# Patient Record
Sex: FEMALE | Race: WHITE | Employment: PART TIME | URBAN - METROPOLITAN AREA
[De-identification: names, ages, dates, MRNs, and addresses within clinical notes are randomized per-mention and may not be internally consistent; named-entity substitution may affect disease eponyms.]

---
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
## 2021-02-12 ENCOUNTER — OFFICE VISIT (OUTPATIENT)
Dept: PRIMARY CARE CLINIC | Age: 21
End: 2021-02-12
Payer: COMMERCIAL

## 2021-02-12 VITALS
WEIGHT: 154 LBS | HEIGHT: 67 IN | HEART RATE: 81 BPM | OXYGEN SATURATION: 99 % | BODY MASS INDEX: 24.17 KG/M2 | SYSTOLIC BLOOD PRESSURE: 118 MMHG | DIASTOLIC BLOOD PRESSURE: 74 MMHG | TEMPERATURE: 97.8 F

## 2021-02-12 DIAGNOSIS — H00.025 HORDEOLUM INTERNUM OF LEFT LOWER EYELID: Primary | ICD-10-CM

## 2021-02-12 DIAGNOSIS — H57.89 EYE SWELLING, LEFT: ICD-10-CM

## 2021-02-12 DIAGNOSIS — H00.015 HORDEOLUM EXTERNUM LEFT LOWER EYELID: ICD-10-CM

## 2021-02-12 PROCEDURE — 99213 OFFICE O/P EST LOW 20 MIN: CPT | Performed by: NURSE PRACTITIONER

## 2021-02-12 RX ORDER — CETIRIZINE HYDROCHLORIDE 10 MG/1
10 TABLET ORAL DAILY
Qty: 30 TABLET | Refills: 0 | Status: SHIPPED
Start: 2021-02-12 | End: 2021-07-07 | Stop reason: ALTCHOICE

## 2021-02-12 RX ORDER — TOBRAMYCIN 3 MG/ML
1 SOLUTION/ DROPS OPHTHALMIC EVERY 4 HOURS
Qty: 1 BOTTLE | Refills: 0 | Status: SHIPPED | OUTPATIENT
Start: 2021-02-12 | End: 2021-02-22

## 2021-02-12 ASSESSMENT — ENCOUNTER SYMPTOMS
COUGH: 0
WHEEZING: 0
NAUSEA: 0
SINUS PAIN: 0
EYE ITCHING: 0
ABDOMINAL PAIN: 0
TROUBLE SWALLOWING: 0
SHORTNESS OF BREATH: 0
EYE REDNESS: 1
EYE DISCHARGE: 0
CHEST TIGHTNESS: 0
VOICE CHANGE: 0
SORE THROAT: 0
RHINORRHEA: 0
FACIAL SWELLING: 0
EYE PAIN: 1
DIARRHEA: 0
VOMITING: 0

## 2021-02-12 ASSESSMENT — VISUAL ACUITY: OU: 1

## 2021-02-12 ASSESSMENT — PATIENT HEALTH QUESTIONNAIRE - PHQ9
1. LITTLE INTEREST OR PLEASURE IN DOING THINGS: 0
SUM OF ALL RESPONSES TO PHQ QUESTIONS 1-9: 0
SUM OF ALL RESPONSES TO PHQ9 QUESTIONS 1 & 2: 0

## 2021-02-12 NOTE — PATIENT INSTRUCTIONS
Patient Education        Styes and Chalazia: Care Instructions  Your Care Instructions     Styes and chalazia (say \"zun-JZW-bzg-uh\") are both conditions that can cause swelling of the eyelid. A stye is an infection in the root of an eyelash. The infection causes a tender red lump on the edge of the eyelid. The infection can spread until the whole eyelid becomes red and inflamed. Styes usually break open, and a tiny amount of pus drains. They usually clear up on their own in about a week, but they sometimes need treatment with antibiotics. A chalazion is a lump or cyst in the eyelid (chalazion is singular; chalazia is plural). It is caused by swelling and inflammation of deep oil glands inside the eyelid. Chalazia are usually not infected. They can take a few months to heal.  If a chalazion becomes more swollen and painful or does not go away, you may need to have it drained by your doctor. Follow-up care is a key part of your treatment and safety. Be sure to make and go to all appointments, and call your doctor if you are having problems. It's also a good idea to know your test results and keep a list of the medicines you take. How can you care for yourself at home? · Do not rub your eyes. Do not squeeze or try to open a stye or chalazion. · To help a stye or chalazion heal faster:  ? Put a warm, moist compress on your eye for 5 to 10 minutes, 3 to 6 times a day. Heat often brings a stye to a point where it drains on its own. Keep in mind that warm compresses will often increase swelling a little at first.  ? Do not use hot water or heat a wet cloth in a microwave oven. The compress may get too hot and can burn the eyelid. · Always wash your hands before and after you use a compress or touch your eyes. · If the doctor gave you antibiotic drops or ointment, use the medicine exactly as directed. Use the medicine for as long as instructed, even if your eye starts to feel better.   · To put in eyedrops or ointment:  ? Tilt your head back, and pull your lower eyelid down with one finger. ? Drop or squirt the medicine inside the lower lid. ? Close your eye for 30 to 60 seconds to let the drops or ointment move around. ? Do not touch the ointment or dropper tip to your eyelashes or any other surface. · Do not wear eye makeup or contact lenses until the stye or chalazion heals. · Do not share towels, pillows, or washcloths while you have a stye. When should you call for help? Call your doctor now or seek immediate medical care if:    · You have pain in your eye.     · You have a change in vision or loss of vision.     · Redness and swelling get much worse. Watch closely for changes in your health, and be sure to contact your doctor if:    · Your stye does not get better in 1 week.     · Your chalazion does not start to get better after several weeks. Where can you learn more? Go to https://[employer]. org and sign in to your [employer] account. Enter W435 in the GoVoluntr box to learn more about \"Styes and Chalazia: Care Instructions. \"     If you do not have an account, please click on the \"Sign Up Now\" link. Current as of: December 18, 2019               Content Version: 12.6  © 2594-7205 [employer]. Care instructions adapted under license by [facility] ([facility]). If you have questions about a medical condition or this instruction, always ask your healthcare professional. [place] [address] any warranty or liability for your use of this information. Patient Education         Here's Help: How to Give Yourself Eyedrops or Eye Ointment (01:53)  Your health professional recommends that you watch this short online health video. Here's help with using eyedrops or an eye ointment. How to watch the video    Scan the QR code   OR Visit the website    https://hwi. se/r/Jnqtoxg78ejvj   Current as of: June 26, 2019               Content Version: 12.6  © 0811-2116 [employer]. Care instructions adapted under license by [facility] ([facility]). If you have questions about a medical condition or this instruction, always ask your healthcare professional. [address] any warranty or liability for your use of this information.

## 2021-02-12 NOTE — PROGRESS NOTES
2021    Nallely Purcell (:  2000) is a 24 y.o. female,Established patient, here for evaluation of the following chief complaint(s):  Eye Problem (left eye swelling( first time) and approx 1 week ago)    Chief Complaint   Patient presents with    Eye Problem     left eye swelling( first time) and approx 1 week ago       ASSESSMENT/PLAN:    1. Hordeolum internum of left lower eyelid  2. Hordeolum externum left lower eyelid  3. Eye swelling, left      No follow-ups on file. SUBJECTIVE/OBJECTIVE:    This is a very pleasant 50-year-old white female who comes with reports of redness and tenderness on the lateral and medial lower lid episodic resolves within 1 or 2 days but has had multiple events since January she denies any trauma or visual changes there is no mucoid discharge no new medications, no new make-up      Review of Systems   Constitutional: Negative for chills, fatigue and fever. HENT: Positive for congestion. Negative for dental problem, ear discharge, ear pain, facial swelling, hearing loss, nosebleeds, postnasal drip, rhinorrhea, sinus pain, sore throat, tinnitus, trouble swallowing and voice change. Eyes: Positive for pain and redness. Negative for discharge and itching. Respiratory: Negative for cough, chest tightness, shortness of breath and wheezing. Cardiovascular: Negative for chest pain. Gastrointestinal: Negative for abdominal pain, diarrhea, nausea and vomiting. Musculoskeletal: Negative for neck pain and neck stiffness. Skin: Negative for rash. Allergic/Immunologic: Negative for environmental allergies. Neurological: Negative for dizziness, facial asymmetry, light-headedness and headaches. Hematological: Negative for adenopathy.          Current Outpatient Medications:     tobramycin (TOBREX) 0.3 % ophthalmic solution, Place 1 drop into the left eye every 4 hours for 10 days, Disp: 1 Bottle, Rfl: 0    cetirizine (ZYRTEC) 10 MG tablet, Take 1 tablet by mouth daily, Disp: 30 tablet, Rfl: 0    desogestrel-ethinyl estradiol (ISIBLOOM) 0.15-30 MG-MCG per tablet, Take 1 tablet by mouth daily, Disp: , Rfl:     No Known Allergies    Vitals:    02/12/21 0942   BP: 118/74   Pulse: 81   Temp: 97.8 °F (36.6 °C)   SpO2: 99%   Weight: 154 lb (69.9 kg)   Height: 5' 7\" (1.702 m)       Wt Readings from Last 3 Encounters:   02/12/21 154 lb (69.9 kg) (84 %, Z= 0.99)*   06/01/20 150 lb (68 kg) (83 %, Z= 0.94)*   02/03/19 130 lb (59 kg) (64 %, Z= 0.35)*     * Growth percentiles are based on [facility] (Girls, 2-20 Years) data. BP Readings from Last 3 Encounters:   02/12/21 118/74   06/01/20 114/72   02/03/19 136/74 (99 %, Z = 2.18 /  76 %, Z = 0.72)*     *BP percentiles are based on the 2017 AAP Clinical Practice Guideline for girls       Physical Exam  Constitutional:       Appearance: Normal appearance. HENT:      Head: Normocephalic. Right Ear: Tympanic membrane and ear canal normal. No decreased hearing noted. No drainage or tenderness. No middle ear effusion. There is no impacted cerumen. No foreign body. Tympanic membrane is not injected, scarred, perforated or erythematous. Left Ear: Tympanic membrane normal. No decreased hearing noted. No drainage or tenderness. No middle ear effusion. There is no impacted cerumen. No foreign body. Tympanic membrane is not injected, scarred, perforated or erythematous. Nose:      Right Sinus: No maxillary sinus tenderness or frontal sinus tenderness. Left Sinus: No maxillary sinus tenderness or frontal sinus tenderness. Eyes:      General: Lids are normal. Vision grossly intact. No allergic shiner. Right eye: No discharge. Left eye: No discharge. Extraocular Movements:      Right eye: No nystagmus. Left eye: No nystagmus. Conjunctiva/sclera:      Right eye: Right conjunctiva is not injected. No exudate or hemorrhage. Left eye: Left conjunctiva is not injected.  No exudate or hemorrhage. Neck:      Musculoskeletal: Normal range of motion. No neck rigidity or muscular tenderness. Thyroid: No thyromegaly. Meningeal: Brudzinski's sign and Kernig's sign absent. Cardiovascular:      Rate and Rhythm: Normal rate and regular rhythm. Heart sounds: Normal heart sounds. Pulmonary:      Effort: No retractions. Breath sounds: No decreased breath sounds, wheezing, rhonchi or rales. Abdominal:      Tenderness: There is no abdominal tenderness. Musculoskeletal: Normal range of motion. Lymphadenopathy:      Cervical: No cervical adenopathy. Right cervical: No superficial, deep or posterior cervical adenopathy. Left cervical: No superficial, deep or posterior cervical adenopathy. Skin:     General: Skin is warm. Neurological:      Mental Status: She is alert. No results found for this visit on 02/12/21. On this date 02/12/21 I have spent 15 minutes reviewing previous notes, test results and face to face with the patient discussing the diagnosis and importance of compliance with the treatment plan as well as documenting on the day of the visit. Educational materials and/or home exercises printed for patient's review and were included in patient instructions on his/her After Visit Summary and given to patient at the end of visit. Counseled regarding above diagnosis, including possible risks and complications,  especially if left uncontrolled. Counseled regarding the possible side effects, risks, benefits and alternatives to treatment; patient and/or guardian verbalizes understanding, agrees, feels comfortable with and wishes to proceed with above treatment plan. Advised patient to call with any new medication issues, and read all Rx info from pharmacy to assure aware of all possible risks and side effects of medication before taking. Reviewed age and gender appropriate health screening exams and vaccinations.   Advised patient

## 2021-12-09 PROBLEM — L52 ERYTHEMA NODOSUM: Status: ACTIVE | Noted: 2021-12-09

## 2023-03-03 DIAGNOSIS — Z34.92 PRENATAL CARE, SECOND TRIMESTER: ICD-10-CM

## 2023-03-06 LAB
GROUP B STREP CULTURE: ABNORMAL
ORGANISM: ABNORMAL

## 2023-03-08 LAB
C TRACH DNA GENITAL QL NAA+PROBE: NEGATIVE
N. GONORRHOEAE DNA: NEGATIVE
SOURCE: NORMAL

## 2023-03-31 PROBLEM — Z34.03 PRIMIGRAVIDA IN THIRD TRIMESTER: Status: ACTIVE | Noted: 2023-03-31

## 2023-03-31 PROBLEM — Z86.19 HISTORY OF HERPES GENITALIS: Status: ACTIVE | Noted: 2023-03-31

## 2023-03-31 PROBLEM — B00.9 HERPES SIMPLEX TYPE 2 (HSV-2) INFECTION AFFECTING PREGNANCY, ANTEPARTUM, THIRD TRIMESTER: Status: ACTIVE | Noted: 2023-03-31

## 2023-03-31 PROBLEM — O98.513 HERPES SIMPLEX TYPE 2 (HSV-2) INFECTION AFFECTING PREGNANCY, ANTEPARTUM, THIRD TRIMESTER: Status: ACTIVE | Noted: 2023-03-31

## 2023-03-31 PROBLEM — O99.820 GBS (GROUP B STREPTOCOCCUS CARRIER), +RV CULTURE, CURRENTLY PREGNANT: Status: ACTIVE | Noted: 2023-03-31

## 2023-03-31 PROBLEM — O09.899 MATERNAL VARICELLA, NON-IMMUNE: Status: ACTIVE | Noted: 2023-03-31

## 2023-03-31 PROBLEM — Z28.39 MATERNAL VARICELLA, NON-IMMUNE: Status: ACTIVE | Noted: 2023-03-31

## 2023-03-31 PROBLEM — O09.299 HISTORY OF SPONTANEOUS ABORTION, CURRENTLY PREGNANT: Status: ACTIVE | Noted: 2023-03-31

## 2023-03-31 PROBLEM — Z28.39 RUBELLA NONIMMUNE STATUS, DELIVERED, CURRENT HOSPITALIZATION: Status: ACTIVE | Noted: 2023-03-31

## 2023-04-11 ENCOUNTER — HOSPITAL ENCOUNTER (INPATIENT)
Age: 23
LOS: 3 days | Discharge: HOME | End: 2023-04-14
Attending: OBSTETRICS & GYNECOLOGY | Admitting: OBSTETRICS & GYNECOLOGY
Payer: COMMERCIAL

## 2023-04-11 ENCOUNTER — APPOINTMENT (OUTPATIENT)
Dept: LABOR AND DELIVERY | Age: 23
End: 2023-04-11
Payer: COMMERCIAL

## 2023-04-11 PROBLEM — Z34.90 ENCOUNTER FOR INDUCTION OF LABOR: Status: ACTIVE | Noted: 2023-04-11

## 2023-04-11 LAB
ABO + RH BLD: NORMAL
AMPHET UR QL SCN: NOT DETECTED
BARBITURATES UR QL SCN: NOT DETECTED
BENZODIAZ UR QL SCN: NOT DETECTED
BLD GP AB SCN SERPL QL: NORMAL
CANNABINOIDS UR QL SCN: NOT DETECTED
COCAINE UR QL SCN: NOT DETECTED
DRUG SCREEN COMMENT UR-IMP: NORMAL
ERYTHROCYTE [DISTWIDTH] IN BLOOD BY AUTOMATED COUNT: 13.7 FL (ref 11.5–15)
FENTANYL SCREEN, URINE: NOT DETECTED
HCT VFR BLD AUTO: 37.5 % (ref 34–48)
HGB BLD-MCNC: 12.9 G/DL (ref 11.5–15.5)
MCH RBC QN AUTO: 32.3 PG (ref 26–35)
MCHC RBC AUTO-ENTMCNC: 34.4 % (ref 32–34.5)
MCV RBC AUTO: 93.8 FL (ref 80–99.9)
METHADONE UR QL SCN: NOT DETECTED
OPIATES UR QL SCN: NOT DETECTED
OXYCODONE URINE: NOT DETECTED
PCP UR QL SCN: NOT DETECTED
PLATELET # BLD AUTO: 157 E9/L (ref 130–450)
PMV BLD AUTO: 12.2 FL (ref 7–12)
RBC # BLD AUTO: 4 E12/L (ref 3.5–5.5)
WBC # BLD: 11.8 E9/L (ref 4.5–11.5)

## 2023-04-11 PROCEDURE — 6360000002 HC RX W HCPCS: Performed by: OBSTETRICS & GYNECOLOGY

## 2023-04-11 PROCEDURE — APPNB15 APP NON BILLABLE TIME 0-15 MINS: Performed by: ADVANCED PRACTICE MIDWIFE

## 2023-04-11 PROCEDURE — 86900 BLOOD TYPING SEROLOGIC ABO: CPT

## 2023-04-11 PROCEDURE — 1220000001 HC SEMI PRIVATE L&D R&B

## 2023-04-11 PROCEDURE — 2500000003 HC RX 250 WO HCPCS: Performed by: ANESTHESIOLOGY

## 2023-04-11 PROCEDURE — 2580000003 HC RX 258: Performed by: OBSTETRICS & GYNECOLOGY

## 2023-04-11 PROCEDURE — 51701 INSERT BLADDER CATHETER: CPT

## 2023-04-11 PROCEDURE — 99221 1ST HOSP IP/OBS SF/LOW 40: CPT | Performed by: ADVANCED PRACTICE MIDWIFE

## 2023-04-11 PROCEDURE — 36415 COLL VENOUS BLD VENIPUNCTURE: CPT

## 2023-04-11 PROCEDURE — 80307 DRUG TEST PRSMV CHEM ANLYZR: CPT

## 2023-04-11 PROCEDURE — 85027 COMPLETE CBC AUTOMATED: CPT

## 2023-04-11 PROCEDURE — 86850 RBC ANTIBODY SCREEN: CPT

## 2023-04-11 PROCEDURE — 86901 BLOOD TYPING SEROLOGIC RH(D): CPT

## 2023-04-11 RX ORDER — PENICILLIN G 3000000 [IU]/50ML
3 INJECTION, SOLUTION INTRAVENOUS EVERY 4 HOURS
Status: DISCONTINUED | OUTPATIENT
Start: 2023-04-11 | End: 2023-04-12

## 2023-04-11 RX ORDER — MISOPROSTOL 200 UG/1
800 TABLET ORAL PRN
Status: DISCONTINUED | OUTPATIENT
Start: 2023-04-11 | End: 2023-04-12

## 2023-04-11 RX ORDER — CARBOPROST TROMETHAMINE 250 UG/ML
250 INJECTION, SOLUTION INTRAMUSCULAR PRN
Status: DISCONTINUED | OUTPATIENT
Start: 2023-04-11 | End: 2023-04-12

## 2023-04-11 RX ORDER — SODIUM CHLORIDE 0.9 % (FLUSH) 0.9 %
5-40 SYRINGE (ML) INJECTION EVERY 12 HOURS SCHEDULED
Status: DISCONTINUED | OUTPATIENT
Start: 2023-04-11 | End: 2023-04-12

## 2023-04-11 RX ORDER — ONDANSETRON 2 MG/ML
4 INJECTION INTRAMUSCULAR; INTRAVENOUS EVERY 6 HOURS PRN
Status: DISCONTINUED | OUTPATIENT
Start: 2023-04-11 | End: 2023-04-12

## 2023-04-11 RX ORDER — NALOXONE HYDROCHLORIDE 0.4 MG/ML
INJECTION, SOLUTION INTRAMUSCULAR; INTRAVENOUS; SUBCUTANEOUS PRN
Status: DISCONTINUED | OUTPATIENT
Start: 2023-04-11 | End: 2023-04-12 | Stop reason: HOSPADM

## 2023-04-11 RX ORDER — SODIUM CHLORIDE, SODIUM LACTATE, POTASSIUM CHLORIDE, AND CALCIUM CHLORIDE .6; .31; .03; .02 G/100ML; G/100ML; G/100ML; G/100ML
1000 INJECTION, SOLUTION INTRAVENOUS PRN
Status: DISCONTINUED | OUTPATIENT
Start: 2023-04-11 | End: 2023-04-12

## 2023-04-11 RX ORDER — SODIUM CHLORIDE, SODIUM LACTATE, POTASSIUM CHLORIDE, CALCIUM CHLORIDE 600; 310; 30; 20 MG/100ML; MG/100ML; MG/100ML; MG/100ML
INJECTION, SOLUTION INTRAVENOUS CONTINUOUS
Status: DISCONTINUED | OUTPATIENT
Start: 2023-04-11 | End: 2023-04-12

## 2023-04-11 RX ORDER — SODIUM CHLORIDE 9 MG/ML
25 INJECTION, SOLUTION INTRAVENOUS PRN
Status: DISCONTINUED | OUTPATIENT
Start: 2023-04-11 | End: 2023-04-12

## 2023-04-11 RX ORDER — DOCUSATE SODIUM 100 MG/1
100 CAPSULE, LIQUID FILLED ORAL 2 TIMES DAILY
Status: DISCONTINUED | OUTPATIENT
Start: 2023-04-11 | End: 2023-04-12

## 2023-04-11 RX ORDER — METHYLERGONOVINE MALEATE 0.2 MG/ML
200 INJECTION INTRAVENOUS PRN
Status: DISCONTINUED | OUTPATIENT
Start: 2023-04-11 | End: 2023-04-12

## 2023-04-11 RX ORDER — ONDANSETRON 2 MG/ML
4 INJECTION INTRAMUSCULAR; INTRAVENOUS EVERY 6 HOURS PRN
Status: DISCONTINUED | OUTPATIENT
Start: 2023-04-11 | End: 2023-04-12 | Stop reason: HOSPADM

## 2023-04-11 RX ORDER — SODIUM CHLORIDE 0.9 % (FLUSH) 0.9 %
5-40 SYRINGE (ML) INJECTION PRN
Status: DISCONTINUED | OUTPATIENT
Start: 2023-04-11 | End: 2023-04-12

## 2023-04-11 RX ORDER — SODIUM CHLORIDE, SODIUM LACTATE, POTASSIUM CHLORIDE, AND CALCIUM CHLORIDE .6; .31; .03; .02 G/100ML; G/100ML; G/100ML; G/100ML
500 INJECTION, SOLUTION INTRAVENOUS PRN
Status: DISCONTINUED | OUTPATIENT
Start: 2023-04-11 | End: 2023-04-12

## 2023-04-11 RX ADMIN — SODIUM CHLORIDE, POTASSIUM CHLORIDE, SODIUM LACTATE AND CALCIUM CHLORIDE 1000 ML: 600; 310; 30; 20 INJECTION, SOLUTION INTRAVENOUS at 14:27

## 2023-04-11 RX ADMIN — Medication 15 ML/HR: at 23:53

## 2023-04-11 RX ADMIN — SODIUM CHLORIDE, POTASSIUM CHLORIDE, SODIUM LACTATE AND CALCIUM CHLORIDE: 600; 310; 30; 20 INJECTION, SOLUTION INTRAVENOUS at 08:15

## 2023-04-11 RX ADMIN — PENICILLIN G 3 MILLION UNITS: 3000000 INJECTION, SOLUTION INTRAVENOUS at 16:35

## 2023-04-11 RX ADMIN — PENICILLIN G 3 MILLION UNITS: 3000000 INJECTION, SOLUTION INTRAVENOUS at 20:51

## 2023-04-11 RX ADMIN — PENICILLIN G 3 MILLION UNITS: 3000000 INJECTION, SOLUTION INTRAVENOUS at 12:48

## 2023-04-11 RX ADMIN — DEXTROSE MONOHYDRATE 5 MILLION UNITS: 50 INJECTION, SOLUTION INTRAVENOUS at 08:53

## 2023-04-11 RX ADMIN — Medication 1 MILLI-UNITS/MIN: at 08:53

## 2023-04-11 NOTE — H&P
CHIEF COMPLAINT:  IOL due to LGA and hx STD's, Some ctx, no lof or vb, no pih sx. Baby moving well    HISTORY OF PRESENT ILLNESS:      The patient is a 25 y.o. female at 43w3d. OB History          2    Para        Term                AB   1    Living             SAB   1    IAB        Ectopic        Molar        Multiple        Live Births                Patient presents with a chief complaint as above and is being admitted for induction    Estimated Due Date: Estimated Date of Delivery: 23    PRENATAL CARE:    Complicated by: Hx trich, HSV on valtrex, hx trich, GBS +, previous miscarriage    PAST OB HISTORY  OB History          2    Para        Term                AB   1    Living             SAB   1    IAB        Ectopic        Molar        Multiple        Live Births                    Past Medical History:    History reviewed. No pertinent past medical history. Past Surgical History:    History reviewed. No pertinent surgical history. Allergies:  Patient has no known allergies.   Social History:    Social History     Socioeconomic History    Marital status: Single     Spouse name: Not on file    Number of children: Not on file    Years of education: Not on file    Highest education level: Not on file   Occupational History    Not on file   Tobacco Use    Smoking status: Former     Types: Cigarettes    Smokeless tobacco: Never    Tobacco comments:     3   a day   Vaping Use    Vaping Use: Never used   Substance and Sexual Activity    Alcohol use: Never    Drug use: Never    Sexual activity: Yes     Partners: Male   Other Topics Concern    Not on file   Social History Narrative    Not on file     Social Determinants of Health     Financial Resource Strain: Not on file   Food Insecurity: Not on file   Transportation Needs: Not on file   Physical Activity: Not on file   Stress: Not on file   Social Connections: Not on file   Intimate Partner Violence: Not on file   Housing

## 2023-04-12 PROBLEM — Z3A.39 39 WEEKS GESTATION OF PREGNANCY: Status: ACTIVE | Noted: 2023-04-12

## 2023-04-12 PROCEDURE — 1220000000 HC SEMI PRIVATE OB R&B

## 2023-04-12 PROCEDURE — 7200000001 HC VAGINAL DELIVERY

## 2023-04-12 PROCEDURE — 2500000003 HC RX 250 WO HCPCS: Performed by: OBSTETRICS & GYNECOLOGY

## 2023-04-12 PROCEDURE — 3E033VJ INTRODUCTION OF OTHER HORMONE INTO PERIPHERAL VEIN, PERCUTANEOUS APPROACH: ICD-10-PCS | Performed by: OBSTETRICS & GYNECOLOGY

## 2023-04-12 PROCEDURE — 6360000002 HC RX W HCPCS: Performed by: OBSTETRICS & GYNECOLOGY

## 2023-04-12 PROCEDURE — 0HQ9XZZ REPAIR PERINEUM SKIN, EXTERNAL APPROACH: ICD-10-PCS | Performed by: OBSTETRICS & GYNECOLOGY

## 2023-04-12 PROCEDURE — 0UQMXZZ REPAIR VULVA, EXTERNAL APPROACH: ICD-10-PCS | Performed by: OBSTETRICS & GYNECOLOGY

## 2023-04-12 PROCEDURE — 6370000000 HC RX 637 (ALT 250 FOR IP): Performed by: OBSTETRICS & GYNECOLOGY

## 2023-04-12 PROCEDURE — 10907ZC DRAINAGE OF AMNIOTIC FLUID, THERAPEUTIC FROM PRODUCTS OF CONCEPTION, VIA NATURAL OR ARTIFICIAL OPENING: ICD-10-PCS | Performed by: OBSTETRICS & GYNECOLOGY

## 2023-04-12 RX ORDER — MODIFIED LANOLIN
OINTMENT (GRAM) TOPICAL PRN
Status: DISCONTINUED | OUTPATIENT
Start: 2023-04-12 | End: 2023-04-14 | Stop reason: HOSPADM

## 2023-04-12 RX ORDER — ACETAMINOPHEN 500 MG
1000 TABLET ORAL EVERY 6 HOURS PRN
Status: DISCONTINUED | OUTPATIENT
Start: 2023-04-12 | End: 2023-04-14 | Stop reason: HOSPADM

## 2023-04-12 RX ORDER — ACETAMINOPHEN 650 MG
TABLET, EXTENDED RELEASE ORAL PRN
Status: DISCONTINUED | OUTPATIENT
Start: 2023-04-12 | End: 2023-04-12

## 2023-04-12 RX ORDER — IBUPROFEN 600 MG/1
600 TABLET ORAL EVERY 6 HOURS PRN
Status: DISCONTINUED | OUTPATIENT
Start: 2023-04-12 | End: 2023-04-14 | Stop reason: HOSPADM

## 2023-04-12 RX ORDER — DOCUSATE SODIUM 100 MG/1
100 CAPSULE, LIQUID FILLED ORAL 2 TIMES DAILY
Status: DISCONTINUED | OUTPATIENT
Start: 2023-04-12 | End: 2023-04-14 | Stop reason: HOSPADM

## 2023-04-12 RX ORDER — LIDOCAINE HYDROCHLORIDE 10 MG/ML
20 INJECTION, SOLUTION EPIDURAL; INFILTRATION; INTRACAUDAL; PERINEURAL ONCE
Status: COMPLETED | OUTPATIENT
Start: 2023-04-12 | End: 2023-04-12

## 2023-04-12 RX ORDER — PRENATAL WITH FERROUS FUM AND FOLIC ACID 3080; 920; 120; 400; 22; 1.84; 3; 20; 10; 1; 12; 200; 27; 25; 2 [IU]/1; [IU]/1; MG/1; [IU]/1; MG/1; MG/1; MG/1; MG/1; MG/1; MG/1; UG/1; MG/1; MG/1; MG/1; MG/1
1 TABLET ORAL
Status: DISCONTINUED | OUTPATIENT
Start: 2023-04-12 | End: 2023-04-14 | Stop reason: HOSPADM

## 2023-04-12 RX ADMIN — LIDOCAINE HYDROCHLORIDE 20 ML: 10 INJECTION, SOLUTION EPIDURAL; INFILTRATION; INTRACAUDAL; PERINEURAL at 01:13

## 2023-04-12 RX ADMIN — METFORMIN HYDROCHLORIDE 1 TABLET: 500 TABLET, EXTENDED RELEASE ORAL at 08:21

## 2023-04-12 RX ADMIN — DOCUSATE SODIUM 100 MG: 100 CAPSULE ORAL at 08:22

## 2023-04-12 RX ADMIN — Medication: at 01:10

## 2023-04-12 RX ADMIN — Medication 87.3 MILLI-UNITS/MIN: at 01:26

## 2023-04-12 RX ADMIN — Medication 166.7 ML: at 01:13

## 2023-04-12 NOTE — LACTATION NOTE
Mom reports baby has latched well a couple times, sleepy now. Encouraged skin to skin and frequent attempts at breast to stimulate milk production. Instructed on normal infant behavior in the first 12-24 hours and importance of stimulating the baby frequently to eat during this time. Reviewed hand expression, and encouraged to hand express drops of colostrum when baby is sleepy. Instructed that baby may also feed 8-12 times a day- cluster feeding at times- as her milk supply is being established. Instructed on benefits of skin to skin and avoidance of pacifier / artificial nipple use until breastfeeding is well established. Educated on making sure infant has an open airway while breastfeeding and skin to skin. Instructed on hunger cues and waking techniques to try. Reviewed signs of adequate I & O; allow baby to feed ad donovan and not to limit time at breast. Breastfeeding booklet provided with review of its contents. Encouraged to call with any concerns. Mom has a breast pump for home use.

## 2023-04-12 NOTE — L&D DELIVERY NOTE
Department of Obstetrics and Gynecology  Spontaneous Vaginal Delivery Note    Patient:  Jorden Cee     Admit Date:  2023  7:22 AM  Medical Record Number:  [id number]   Procedure Date: 2023 2:23 AM     Pre-delivery Diagnosis: Functional primigravida IUP at 44 weeks 5 days with history of spontaneous , GBS positive, rubella equivocal, maternal varicella nonimmune, HSV 1 genital culture positive during pregnancy (inactive on prophylactic Valtrex), history of trichomonas (treated with negative test of cure)  Patient Active Problem List   Diagnosis    Erythema nodosum    Functional Primigravida in third trimester (transfer from [place] at 26w)    GBS (group B Streptococcus carrier), +RV culture, currently pregnant -PCN G in labor    History of spontaneous  x1, currently pregnant    H/O trichomonal vaginitis -was treated and repeat EMERSON negative with previous office     Herpes simplex type 2 (HSV-2) infection affecting pregnancy, antepartum, third trimester - Valtrex started at 36 weeks. Maternal varicella, non-immune    Rubella EQUIVOCAL status, delivered, current hospitalization    History of herpes genitalis  (HSV-1 + genital culture during pregnancy)    Encounter for induction of labor    39 5/7 weeks gestation of pregnancy      Post-delivery Diagnosis:  Living  infant Female, tight nuchal cord x1, bilateral labia lacerations, first-degree perineal laceration  Patient Active Problem List   Diagnosis    Erythema nodosum    Functional Primigravida in third trimester (transfer from [place] at 26w)    GBS (group B Streptococcus carrier), +RV culture, currently pregnant -PCN G in labor    History of spontaneous  x1, currently pregnant    H/O trichomonal vaginitis -was treated and repeat EMERSON negative with previous office     Herpes simplex type 2 (HSV-2) infection affecting pregnancy, antepartum, third trimester - Valtrex started at 36 weeks.     Maternal varicella, non-immune
Pt states he had a TIA last week however never came to hospital.   Today comes in for chest pressure that started while driving. saw cardiologist 3 years ago and was cleared. Currently doesn't have any pain or sob.

## 2023-04-12 NOTE — DISCHARGE INSTRUCTIONS
Follow up with your OB doctor in 2 week for a  delivery or in 6 weeks for a vaginal delivery unless otherwise instructed, call the office for appointment. .  For breastfeeding support, you can contact our lactation specialists at [phone] or   [phone]. DIET  Eat a well balanced diet focusing on foods high in fiber and protein  Drink plenty of fluids especially water. To avoid constipation you may take a mild stool softener as recommended by your doctor or midwife. ACTIVITY  Gradually increase your activity. Resume exercise regimen only after advised by your doctor or midwife. Avoid lifting anything heavier than your baby or a gallon of milk until OK'd by your physician or midlife. Avoid driving until your doctor or midwife has given their approval.  Leana Owens slowly from a lying to sitting and then a standing position. Climb stairs one at a time. Use caution when carrying your baby up and down the stairs. No sexual activity for 6 weeks or until advised by your doctor - Nothing in vagina: intercourse, tampons, or douching. Be prepared to discuss family planning at your follow-up OB visit. You may feel tired or have a lack of energy. You may continue your prenatal vitamin to replenish nutrients post delivery. Nap when infant naps to catch up on sleep. May return to work or school in 6 weeks or as directed by physician or midlife. Take pain medication as prescribed whenever you need them  Take care of yourself by sleeping/resting as much as possible. Let someone else care for you, your infant and housework as much as possible for a while. EMOTIONS  You may feed daniel, sad, teary, & overwhelmed. If infant will not stop crying, contact another adult for help or place infant in their crib on their back and take a break. NEVER shake your infant.     Call your doctor if you have unrelieved feelings of: inability to cope,

## 2023-04-12 NOTE — CARE COORDINATION
Social Work discharge [id number] [facility] consult stating \"concern for safety\" noted. SW met with Roger Lao, first time mother of  Hayde Lofton. Woman in room that Roger Lao identified as her mother, Emilio Bernardo, was also present. Baby was bedside in [facility]. Roger Lao said she lives with her mother and father Richardson Ragsdale 15 yo brother, as well as reported FOB Sergey Callejaslow (83) on the family[place] farm. Roger Lao is on her father [employer]. Roger Lao said she has not spoken to financial aide yet to apply for medicaid for baby. Roger Lao reports having transportation, a car seat and a crib for baby. PNC was with Dr Rancho Grijalva and Pediatric care will be with Dr Ricci Stinson. Yamileth's mother Richardson Ragsdale left room upon SW's request for information privacy. SW advised Roger Lao that SW consulted re: concern for her safety with reported FOB. Roger Lao denied hx DV in their relationship. Roger Lao said she is safe at home with Cody Huff there. Roger Lao said she does not need DV information. SW advised her to call 211 for info if she changes her mind, or 911 if an emergency occurs. She stated understanding. She made appropriate eye contact during conversation. She did not appear anxious at the time of talking to SW. She denied need for counseling resources. She agreed tot alk to her medical provider if PPD concerns arise. Roger Lao denied questions / concerns / needs at this time. Encouraged her to ask for SW if questions arise. PLAN; [facility] [employer] contact #[phone] added to Yamileth's AVS.  Baby to home with Roger Lao at discharge.    Electronically signed by Candy Baca on 2023 at 3:08 PM

## 2023-04-12 NOTE — FLOWSHEET NOTE
Patient admitted into room and oriented to surroundings. Introduced self and wrote this RN's name and phone extension on patient's white board. Phone and nurse's call light at patient's bedside and instructed to use for any needs. Patient instructed on new admission informational packet at bedside with information on infant testing to be done when infant is 24 hours old including [address] labs, 24 hours blood sugar, and CCHD. Patient instructed on mom baby unit policies and procedures including need to keep infant in bassinet for transport in hallways and for infant to sleep alone, on back, in an empty bassinet. Patient also instructed patient on unit visitation policy and that one same support person 25years old or older may stay overnight if desired. Patient verbalized understanding of all of the above. Received  Tdap.

## 2023-04-13 PROCEDURE — 1220000000 HC SEMI PRIVATE OB R&B

## 2023-04-13 PROCEDURE — 6370000000 HC RX 637 (ALT 250 FOR IP): Performed by: OBSTETRICS & GYNECOLOGY

## 2023-04-13 RX ADMIN — Medication: at 01:46

## 2023-04-13 RX ADMIN — DOCUSATE SODIUM 100 MG: 100 CAPSULE ORAL at 20:17

## 2023-04-13 RX ADMIN — DOCUSATE SODIUM 100 MG: 100 CAPSULE ORAL at 01:46

## 2023-04-13 RX ADMIN — DOCUSATE SODIUM 100 MG: 100 CAPSULE ORAL at 07:56

## 2023-04-13 NOTE — PLAN OF CARE
Problem: Vaginal Birth or  Section  Goal: Fetal and maternal status remain reassuring during the birth process  Description:  Birth OB-Pregnancy care plan goal which identifies if the fetal and maternal status remain reassuring during the birth process  Outcome: Progressing     Problem: Postpartum  Goal: Experiences normal postpartum course  Description:  Postpartum OB-Pregnancy care plan goal which identifies if the mother is experiencing a normal postpartum course  Outcome: Progressing     Problem: Postpartum  Goal: Appropriate maternal -  bonding  Description:  Postpartum OB-Pregnancy care plan goal which identifies if the mother and  are bonding appropriately  Outcome: Progressing     Problem: Postpartum  Goal: Establishment of infant feeding pattern  Description:  Postpartum OB-Pregnancy care plan goal which identifies if the mother is establishing a feeding pattern with their   Outcome: Progressing     Problem: Pain  Goal: Verbalizes/displays adequate comfort level or baseline comfort level  Outcome: Progressing     Problem: Safety - Adult  Goal: Free from fall injury  Outcome: Progressing

## 2023-04-13 NOTE — PLAN OF CARE
Problem: Vaginal Birth or  Section  Goal: Fetal and maternal status remain reassuring during the birth process  Description:  Birth OB-Pregnancy care plan goal which identifies if the fetal and maternal status remain reassuring during the birth process  2023 by Chuck Jones RN  Outcome: Completed  2023 by Velasquez Castrejon RN  Outcome: Progressing     Problem: Postpartum  Goal: Experiences normal postpartum course  Description:  Postpartum OB-Pregnancy care plan goal which identifies if the mother is experiencing a normal postpartum course  2023 by Chuck Jones RN  Outcome: Completed  2023 by Velasquez Castrejon RN  Outcome: Progressing  Goal: Appropriate maternal -  bonding  Description:  Postpartum OB-Pregnancy care plan goal which identifies if the mother and  are bonding appropriately  2023 by Chuck Jones RN  Outcome: Completed  2023 by Velasquez Castrejon RN  Outcome: Progressing  Goal: Establishment of infant feeding pattern  Description:  Postpartum OB-Pregnancy care plan goal which identifies if the mother is establishing a feeding pattern with their   2023 by Chuck Jones RN  Outcome: Completed  2023 by Velasquez Castrejon RN  Outcome: Progressing  Goal: Incisions, wounds, or drain sites healing without S/S of infection  Outcome: Completed     Problem: Pain  Goal: Verbalizes/displays adequate comfort level or baseline comfort level  2023 by Chuck Jones RN  Outcome: Completed  2023 by Velasquez Castrejon RN  Outcome: Progressing     Problem: Safety - Adult  Goal: Free from fall injury  2023 by Chuck Jones RN  Outcome: Completed  2023 by Velasquez Castrejon RN  Outcome: Progressing

## 2023-04-13 NOTE — CARE COORDINATION
Social Work discharge planning    Per request of Burt with [employer], DIMITRIOS gave paperwork that [facility] emailed to this DIMITRIOS to give to Salas. DIMITRIOS gave to Salas to review and sign. Then DIMITRIOS will fax back to [facility] for Mobile Messenger application.   Electronically signed by Izabella Haynes on 4/13/2023 at 12:06 PM

## 2023-04-13 NOTE — LACTATION NOTE
Mom continues to breastfeed her baby with no complaints. Encouraged mom to call us with questions or for assistance.

## 2023-04-14 VITALS
HEART RATE: 72 BPM | BODY MASS INDEX: 28.88 KG/M2 | DIASTOLIC BLOOD PRESSURE: 66 MMHG | SYSTOLIC BLOOD PRESSURE: 117 MMHG | RESPIRATION RATE: 18 BRPM | HEIGHT: 67 IN | WEIGHT: 184 LBS | TEMPERATURE: 98.2 F | OXYGEN SATURATION: 96 %

## 2023-04-14 PROBLEM — O09.299 HISTORY OF SPONTANEOUS ABORTION, CURRENTLY PREGNANT: Status: RESOLVED | Noted: 2023-03-31 | Resolved: 2023-04-14

## 2023-04-14 PROBLEM — Z86.19 H/O TRICHOMONAL VAGINITIS: Status: RESOLVED | Noted: 2023-03-31 | Resolved: 2023-04-14

## 2023-04-14 PROBLEM — Z3A.39 39 WEEKS GESTATION OF PREGNANCY: Status: RESOLVED | Noted: 2023-04-12 | Resolved: 2023-04-14

## 2023-04-14 PROBLEM — O99.820 GBS (GROUP B STREPTOCOCCUS CARRIER), +RV CULTURE, CURRENTLY PREGNANT: Status: RESOLVED | Noted: 2023-03-31 | Resolved: 2023-04-14

## 2023-04-14 PROBLEM — Z34.90 ENCOUNTER FOR INDUCTION OF LABOR: Status: RESOLVED | Noted: 2023-04-11 | Resolved: 2023-04-14

## 2023-04-14 PROBLEM — Z34.03 PRIMIGRAVIDA IN THIRD TRIMESTER: Status: RESOLVED | Noted: 2023-03-31 | Resolved: 2023-04-14

## 2023-04-14 PROBLEM — B00.9 HERPES SIMPLEX TYPE 2 (HSV-2) INFECTION AFFECTING PREGNANCY, ANTEPARTUM, THIRD TRIMESTER: Status: RESOLVED | Noted: 2023-03-31 | Resolved: 2023-04-14

## 2023-04-14 PROBLEM — O98.513 HERPES SIMPLEX TYPE 2 (HSV-2) INFECTION AFFECTING PREGNANCY, ANTEPARTUM, THIRD TRIMESTER: Status: RESOLVED | Noted: 2023-03-31 | Resolved: 2023-04-14

## 2023-04-14 PROCEDURE — 90471 IMMUNIZATION ADMIN: CPT | Performed by: OBSTETRICS & GYNECOLOGY

## 2023-04-14 PROCEDURE — 90707 MMR VACCINE SC: CPT | Performed by: OBSTETRICS & GYNECOLOGY

## 2023-04-14 PROCEDURE — 6360000002 HC RX W HCPCS: Performed by: OBSTETRICS & GYNECOLOGY

## 2023-04-14 PROCEDURE — 6370000000 HC RX 637 (ALT 250 FOR IP): Performed by: OBSTETRICS & GYNECOLOGY

## 2023-04-14 RX ORDER — MODIFIED LANOLIN
1 OINTMENT (GRAM) TOPICAL PRN
COMMUNITY
Start: 2023-04-14

## 2023-04-14 RX ORDER — IBUPROFEN 200 MG
400 TABLET ORAL EVERY 6 HOURS PRN
COMMUNITY
Start: 2023-04-14

## 2023-04-14 RX ORDER — PSEUDOEPHEDRINE HCL 30 MG
100 TABLET ORAL 2 TIMES DAILY PRN
COMMUNITY
Start: 2023-04-14

## 2023-04-14 RX ORDER — ACETAMINOPHEN 500 MG
1000 TABLET ORAL EVERY 6 HOURS PRN
Refills: 0 | COMMUNITY
Start: 2023-04-14

## 2023-04-14 RX ADMIN — METFORMIN HYDROCHLORIDE 1 TABLET: 500 TABLET, EXTENDED RELEASE ORAL at 08:12

## 2023-04-14 RX ADMIN — DOCUSATE SODIUM 100 MG: 100 CAPSULE ORAL at 08:12

## 2023-04-14 RX ADMIN — MEASLES, MUMPS, AND RUBELLA VIRUS VACCINE LIVE 0.5 ML: 1000; 12500; 1000 INJECTION, POWDER, LYOPHILIZED, FOR SUSPENSION SUBCUTANEOUS at 09:18

## 2023-04-14 NOTE — DISCHARGE SUMMARY
Department of Obstetrics and Gynecology  Labor and Delivery  Discharge Summary    Patient:  Dayo Brannon         Medical Record Number:  [id number]    Admit Date:  2023  7:22 AM    Discharge Date: 2023    Final Diagnosis:   Principal Problem (Resolved):    Encounter for induction of labor  Active Problems:     (normal spontaneous vaginal delivery)    First degree perineal laceration during delivery    Obstetric labial laceration, delivered, current hospitalization    Maternal varicella, non-immune    Rubella EQUIVOCAL status, delivered, current hospitalization    History of herpes genitalis  (HSV-1 + genital culture during pregnancy)  Resolved Problems:    Functional Primigravida in third trimester (transfer from [place] at 26w)    GBS (group B Streptococcus carrier), +RV culture, currently pregnant -PCN G in labor    44 5/7 weeks gestation of pregnancy    Nuchal cord with compression, delivered, current hospitalization    History of spontaneous  x1, currently pregnant    H/O trichomonal vaginitis -was treated and repeat EMEROSN negative with previous office     Herpes simplex type 2 (HSV-2) infection affecting pregnancy, antepartum, third trimester - Valtrex started at 36 weeks. Chief Complaint - Presenting Illness - Physical Findings:   Encounter for induction of labor [Z34.90]  HPI: 25 y.o. W female  with a history of spontaneous  at 38w11d and a pregnancy complicated by GBS colonization, rubella equivocal and varicella nonimmune status, herpes genitalis (inactive on prophylactic Valtrex) and a history of trichomonas during pregnancy (treated) who was admitted for Pitocin induction of labor (BS = 8) and penicillin G prophylaxis.     Hospital Course:   Delivery Summary:  Procedure Date: 2023 2:23 AM      Pre-delivery Diagnosis: Functional primigravida IUP at 39 weeks 5 days with history of spontaneous , GBS positive, rubella equivocal, maternal varicella nonimmune,

## 2023-04-14 NOTE — PROGRESS NOTES
Assumed patient care. Patient in bed, comfortable with epidural. Denies any needs or complaints at this time. Call light within reach.
Delivery of a viable baby girl via  at [address] [place]. Apgars 8/9, vitals stable. Infant placed skin-to-skin with patient.
Dr. Evelyn Ellis called and asked that the pitocin be shut off due to tachysystole.   Pitocin off at 1815
Dr. Rita Velazquez at bedside     Patient actively pushing. RN remains in continuous attendance at the bedside. Assessment & evaluation of fetal heart rate, ongoing via continuous EFM.
Dr. Sanz Persons updated patient SVE 10/100/+1 and patient is feeling pressure.  States he is on his way for delivery
L&D PROGRESS NOTE:    Patient:  Lali Sainz     Admit Date:  2023  7:22 AM  Medical Record Number:  [id number]   Today's Date: 2023    S: Dr Phoebe Wilks MD requesting AROM. O:   Vitals:    23 0900 23 0908 23 1031 23 1237   BP: 118/73 (!) 90/51 135/79 (!) 115/58   Pulse: (!) 107 74 73 72   Resp:   18 16   Temp:   98.2 °F (36.8 °C) 98 °F (36.7 °C)   TempSrc:   Oral Oral   Weight:       Height:         FHR:  Reassuring. Cervix: 3 cm / 70 / medium / -2. TOCO:  2 minutes-3 minutes. A:22 y.o.  female at 43w3d   Term pregnancy and Single fetus IOL   Patient Active Problem List   Diagnosis    Erythema nodosum    Functional Primigravida in third trimester (transfer from [place] at 26w)    GBS (group B Streptococcus carrier), +RV culture, currently pregnant -PCN G in labor    History of spontaneous  x1, currently pregnant    H/O trichomonal vaginitis    Herpes simplex type 2 (HSV-2) infection affecting pregnancy, antepartum, third trimester - Valtrex started at 36 weeks. Maternal varicella, non-immune    Rubella EQUIVOCAL status, delivered, current hospitalization    History of herpes genitalis  (HSV-1 + genital culture during pregnancy)    Encounter for induction of labor     Fetal status: Reassuring  GBS: positive    P: AROM without difficulty using the Amnihook. Ruptured clear fluid.   IV bolus/O2/position changes prn  See orders per Dr. Phoebe Wilks MD.    Gerald Suggs, KASSI - JEFFYM  2023 12:51 PM
PPD #2     Patient:  Celeste Aggarwal     Admit Date:  4/11/2023  7:22 AM  Medical Record Number:  [id number]   Today's Date: 4/14/2023    S: No complaints, doing well, ready for discharge; tolerating diet: yes -general; ambulating well: yes -up in room and in halls; voiding without difficulty:  yes -has no urinary; bm: yes -normal; flatus: yes -normal; pain meds appropriate: Has not used any; vaginal bleeding: Less than a period.     O:   Vitals:    04/12/23 2334 04/13/23 0712 04/13/23 1515 04/13/23 2357   BP: 129/74 105/67 131/74 131/61   Pulse: 90 65 77 77   Resp: 16 16 18 16   Temp: 97.8 °F (36.6 °C) 97.7 °F (36.5 °C) 98.6 °F (37 °C) 99.3 °F (37.4 °C)   TempSrc: Oral Oral Oral Oral   SpO2: 95% 98% 97% 96%   Weight:       Height:         Gen: A&O, cooperative, pleasant   Heart: RRR   Lungs: CTA b/l   Abd; soft, NT, non distended, +BS  Back: no CVA or paraspinal tenderness   Ext: No clubbing, cyanosis, edema:no , no cords palpable, no calf tenderness   Neuro: intact   Uterus: firm, well contracted, nt   Perineum: intact, healing well   Darling pad: staining only, thin lochia    Lab Results   Component Value Date    HGB 12.9 04/11/2023    HCT 37.5 04/11/2023      Recent Results (from the past 72 hour(s))   CBC    Collection Time: 04/11/23  8:30 AM   Result Value Ref Range    WBC 11.8 (H) 4.5 - 11.5 E9/L    RBC 4.00 3.50 - 5.50 E12/L    Hemoglobin 12.9 11.5 - 15.5 g/dL    Hematocrit 37.5 34.0 - 48.0 %    MCV 93.8 80.0 - 99.9 fL    MCH 32.3 26.0 - 35.0 pg    MCHC 34.4 32.0 - 34.5 %    RDW 13.7 11.5 - 15.0 fL    Platelets 757 360 - 114 E9/L    MPV 12.2 (H) 7.0 - 12.0 fL   TYPE AND SCREEN    Collection Time: 04/11/23  8:30 AM   Result Value Ref Range    ABO/Rh A POS     Antibody Screen NEG    Urine Drug Screen    Collection Time: 04/11/23  8:30 AM   Result Value Ref Range    Amphetamine Screen, Urine NOT DETECTED Negative <1000 ng/mL    Barbiturate Screen, Ur NOT DETECTED Negative < 200 ng/mL    Benzodiazepine Screen, Urine
Patient actively pushing. RN remains in continuous attendance at the bedside. Assessment & evaluation of fetal heart rate, ongoing via continuous EFM.
Patient here for a scheduled induction.  @ 39.4 weeks.   GBS+  Denies any leaking bleeding or contractions
Patient to bathroom via [place], voided without difficulty. Darling care provided. Patient transferred to mom and baby unit at this time.
S.O left to get food. Entered room to assist with breastfeeding, patient was tearful but more expressive and talkative while the S.O was absent. NP entered room to assess baby. Will return to assist with breastfeeding.
[employer] [place] [facility] screening results were discussed with parent. Questions answered. Brochure given to parent. Advised to monitor developmental milestones and contact physician for any concerns.    Sue Brandon, AuD
Updated Dr. James Light patient SVE remains unchanged, and pitocin was unable to be restarted at this time d/t variable and early decelerations with each contraction. Order received to place meadows catheter and start amnioinfusion.
Updated Dr. Kathy Wallace patient SVE 7/80/-1 and contractions have spaced to every 3 minutes. Order to restart pitocin.
Went in and did an assessment. S.O was present, very animated and vocal in answering questions for the patient. The patient reserved and did not make eye contact. Assisted the pt to the bathroom to void, instructed patient on chano care with instructor at bedside. The S.O hovered in the bathroom during patient eduction. Pt denies pain and voided without difficulty, pt ambulated back to bed without assistance. Primary RN aware of the dynamics in the room. Will go in to speak with pt while S.O is off of the unit.
Drug Screen    Collection Time: 23  8:30 AM   Result Value Ref Range    Amphetamine Screen, Urine NOT DETECTED Negative <1000 ng/mL    Barbiturate Screen, Ur NOT DETECTED Negative < 200 ng/mL    Benzodiazepine Screen, Urine NOT DETECTED Negative < 200 ng/mL    Cannabinoid Scrn, Ur NOT DETECTED Negative < 50ng/mL    Cocaine Metabolite Screen, Urine NOT DETECTED Negative < 300 ng/mL    Opiate Scrn, Ur NOT DETECTED Negative < 300ng/mL    PCP Screen, Urine NOT DETECTED Negative < 25 ng/mL    Methadone Screen, Urine NOT DETECTED Negative <300 ng/mL    Oxycodone Urine NOT DETECTED Negative <100 ng/mL    FENTANYL SCREEN, URINE NOT DETECTED Negative <1 ng/mL    Drug Screen Comment: see below        A: 25 y.o. female  at 39w5d weeks  PPD #1 S/P ; Episiotomy was not needed due to a spontaneous 1st degree (mucosa only) vaginal/perineal laceration and bilateral labial lacerations  -requesting a day #1 discharge. (Baby not discharged per peds)  Rubella equivocal status  Maternal varicella nonimmune  History of HSV-2 -inactive  Patient Active Problem List   Diagnosis    Erythema nodosum    Functional Primigravida in third trimester (transfer from [place] at 26w)    GBS (group B Streptococcus carrier), +RV culture, currently pregnant -PCN G in labor    History of spontaneous  x1, currently pregnant    H/O trichomonal vaginitis -was treated and repeat EMERSON negative with previous office     Herpes simplex type 2 (HSV-2) infection affecting pregnancy, antepartum, third trimester - Valtrex started at 36 weeks.     Maternal varicella, non-immune    Rubella EQUIVOCAL status, delivered, current hospitalization    History of herpes genitalis  (HSV-1 + genital culture during pregnancy)    Encounter for induction of labor    39 5/7 weeks gestation of pregnancy     (normal spontaneous vaginal delivery)    First degree perineal laceration during delivery    Obstetric labial laceration, delivered, current

## 2023-04-14 NOTE — DISCHARGE INSTR - ACTIVITY
=)))    Please call immediately with Questions and Concerns - [phone] (Office) or [phone] (Answering Service) after hours and weekends. By signing below, I understand that if any emergent problems occur, once I leave the hospital, I am to dial #[phone] or go immediately to the nearest Emergency Room. For less emergent matters,  Dr. Rancho Grijalva can be reached by calling his Office or  answering service at the above numbers. I understand and acknowledge receipt of the instructions indicated above.

## 2023-04-14 NOTE — DISCHARGE INSTR - DIET

## 2025-05-22 ENCOUNTER — ANESTHESIA EVENT (OUTPATIENT)
Dept: LABOR AND DELIVERY | Age: 25
End: 2025-05-22
Payer: MEDICAID

## 2025-05-22 ENCOUNTER — ANESTHESIA (OUTPATIENT)
Dept: LABOR AND DELIVERY | Age: 25
End: 2025-05-22
Payer: MEDICAID

## 2025-05-22 ENCOUNTER — HOSPITAL ENCOUNTER (INPATIENT)
Age: 25
LOS: 2 days | Discharge: HOME | DRG: 560 | End: 2025-05-24
Attending: OBSTETRICS & GYNECOLOGY | Admitting: OBSTETRICS & GYNECOLOGY
Payer: MEDICAID

## 2025-05-22 PROBLEM — L52 ERYTHEMA NODOSUM: Status: RESOLVED | Noted: 2021-12-09 | Resolved: 2025-05-22

## 2025-05-22 PROBLEM — Z28.39 RUBELLA NONIMMUNE STATUS, DELIVERED, CURRENT HOSPITALIZATION: Status: RESOLVED | Noted: 2023-03-31 | Resolved: 2025-05-22

## 2025-05-22 PROBLEM — Z3A.38 38 WEEKS GESTATION OF PREGNANCY: Status: ACTIVE | Noted: 2025-05-22

## 2025-05-22 PROBLEM — Z37.9 NORMAL LABOR: Status: ACTIVE | Noted: 2025-05-22

## 2025-05-22 LAB
ABO + RH BLD: NORMAL
AMPHET UR QL SCN: NEGATIVE
ARM BAND NUMBER: NORMAL
BARBITURATES UR QL SCN: NEGATIVE
BASOPHILS # BLD: 0.02 K/UL (ref 0–0.2)
BASOPHILS NFR BLD: 0 % (ref 0–2)
BENZODIAZ UR QL: NEGATIVE
BLOOD BANK SAMPLE EXPIRATION: NORMAL
BLOOD GROUP ANTIBODIES SERPL: NEGATIVE
BUPRENORPHINE UR QL: NEGATIVE
CANNABINOIDS UR QL SCN: NEGATIVE
COCAINE UR QL SCN: NEGATIVE
EOSINOPHIL # BLD: 0.03 K/UL (ref 0.05–0.5)
EOSINOPHILS RELATIVE PERCENT: 0 % (ref 0–6)
ERYTHROCYTE [DISTWIDTH] IN BLOOD BY AUTOMATED COUNT: 13.3 % (ref 11.5–15)
FENTANYL UR QL: NEGATIVE
HCT VFR BLD AUTO: 37.4 % (ref 34–48)
HGB BLD-MCNC: 13.5 G/DL (ref 11.5–15.5)
IMM GRANULOCYTES # BLD AUTO: 0.1 K/UL (ref 0–0.58)
IMM GRANULOCYTES NFR BLD: 1 % (ref 0–5)
LYMPHOCYTES NFR BLD: 1.35 K/UL (ref 1.5–4)
LYMPHOCYTES RELATIVE PERCENT: 13 % (ref 20–42)
MCH RBC QN AUTO: 33.1 PG (ref 26–35)
MCHC RBC AUTO-ENTMCNC: 36.1 G/DL (ref 32–34.5)
MCV RBC AUTO: 91.7 FL (ref 80–99.9)
METHADONE UR QL: NEGATIVE
MONOCYTES NFR BLD: 0.66 K/UL (ref 0.1–0.95)
MONOCYTES NFR BLD: 6 % (ref 2–12)
NEUTROPHILS NFR BLD: 79 % (ref 43–80)
NEUTS SEG NFR BLD: 8.17 K/UL (ref 1.8–7.3)
OPIATES UR QL SCN: NEGATIVE
OXYCODONE UR QL SCN: NEGATIVE
PCP UR QL SCN: NEGATIVE
PLATELET # BLD AUTO: 166 K/UL (ref 130–450)
PMV BLD AUTO: 12.3 FL (ref 7–12)
RBC # BLD AUTO: 4.08 M/UL (ref 3.5–5.5)
TEST INFORMATION: NORMAL
WBC OTHER # BLD: 10.3 K/UL (ref 4.5–11.5)

## 2025-05-22 PROCEDURE — 86900 BLOOD TYPING SEROLOGIC ABO: CPT

## 2025-05-22 PROCEDURE — 86850 RBC ANTIBODY SCREEN: CPT

## 2025-05-22 PROCEDURE — 2500000003 HC RX 250 WO HCPCS: Performed by: ANESTHESIOLOGY

## 2025-05-22 PROCEDURE — 1220000001 HC SEMI PRIVATE L&D R&B

## 2025-05-22 PROCEDURE — 3700000025 EPIDURAL BLOCK: Performed by: ANESTHESIOLOGY

## 2025-05-22 PROCEDURE — 80307 DRUG TEST PRSMV CHEM ANLYZR: CPT

## 2025-05-22 PROCEDURE — 85025 COMPLETE CBC W/AUTO DIFF WBC: CPT

## 2025-05-22 PROCEDURE — 2580000003 HC RX 258: Performed by: OBSTETRICS & GYNECOLOGY

## 2025-05-22 PROCEDURE — 86901 BLOOD TYPING SEROLOGIC RH(D): CPT

## 2025-05-22 PROCEDURE — 6360000002 HC RX W HCPCS: Performed by: OBSTETRICS & GYNECOLOGY

## 2025-05-22 RX ORDER — NALOXONE HYDROCHLORIDE 0.4 MG/ML
INJECTION, SOLUTION INTRAMUSCULAR; INTRAVENOUS; SUBCUTANEOUS PRN
Status: DISCONTINUED | OUTPATIENT
Start: 2025-05-22 | End: 2025-05-23

## 2025-05-22 RX ORDER — ONDANSETRON 2 MG/ML
4 INJECTION INTRAMUSCULAR; INTRAVENOUS EVERY 6 HOURS PRN
Status: DISCONTINUED | OUTPATIENT
Start: 2025-05-22 | End: 2025-05-23

## 2025-05-22 RX ORDER — PENICILLIN G 3000000 [IU]/50ML
3 INJECTION, SOLUTION INTRAVENOUS EVERY 4 HOURS
Status: DISCONTINUED | OUTPATIENT
Start: 2025-05-22 | End: 2025-05-23

## 2025-05-22 RX ORDER — ACETAMINOPHEN 650 MG
TABLET, EXTENDED RELEASE ORAL
Status: DISCONTINUED
Start: 2025-05-22 | End: 2025-05-23

## 2025-05-22 RX ORDER — LIDOCAINE HYDROCHLORIDE 10 MG/ML
INJECTION, SOLUTION INFILTRATION; PERINEURAL
Status: DISCONTINUED
Start: 2025-05-22 | End: 2025-05-23

## 2025-05-22 RX ORDER — TRANEXAMIC ACID 10 MG/ML
1000 INJECTION, SOLUTION INTRAVENOUS
Status: DISCONTINUED | OUTPATIENT
Start: 2025-05-22 | End: 2025-05-23

## 2025-05-22 RX ORDER — SODIUM CHLORIDE, SODIUM LACTATE, POTASSIUM CHLORIDE, CALCIUM CHLORIDE 600; 310; 30; 20 MG/100ML; MG/100ML; MG/100ML; MG/100ML
INJECTION, SOLUTION INTRAVENOUS CONTINUOUS
Status: DISCONTINUED | OUTPATIENT
Start: 2025-05-22 | End: 2025-05-23

## 2025-05-22 RX ORDER — CARBOPROST TROMETHAMINE 250 UG/ML
250 INJECTION, SOLUTION INTRAMUSCULAR PRN
Status: DISCONTINUED | OUTPATIENT
Start: 2025-05-22 | End: 2025-05-23

## 2025-05-22 RX ORDER — ONDANSETRON 4 MG/1
4 TABLET, ORALLY DISINTEGRATING ORAL EVERY 8 HOURS PRN
Status: DISCONTINUED | OUTPATIENT
Start: 2025-05-22 | End: 2025-05-23

## 2025-05-22 RX ORDER — DEXTROSE MONOHYDRATE 50 MG/ML
INJECTION, SOLUTION INTRAVENOUS
Status: DISCONTINUED
Start: 2025-05-22 | End: 2025-05-23

## 2025-05-22 RX ORDER — MISOPROSTOL 200 UG/1
400 TABLET ORAL PRN
Status: DISCONTINUED | OUTPATIENT
Start: 2025-05-22 | End: 2025-05-23

## 2025-05-22 RX ORDER — METHYLERGONOVINE MALEATE 0.2 MG/ML
200 INJECTION INTRAVENOUS PRN
Status: DISCONTINUED | OUTPATIENT
Start: 2025-05-22 | End: 2025-05-23

## 2025-05-22 RX ORDER — SODIUM CHLORIDE, SODIUM LACTATE, POTASSIUM CHLORIDE, AND CALCIUM CHLORIDE .6; .31; .03; .02 G/100ML; G/100ML; G/100ML; G/100ML
500 INJECTION, SOLUTION INTRAVENOUS PRN
Status: DISCONTINUED | OUTPATIENT
Start: 2025-05-22 | End: 2025-05-23

## 2025-05-22 RX ORDER — TERBUTALINE SULFATE 1 MG/ML
0.25 INJECTION SUBCUTANEOUS
Status: DISCONTINUED | OUTPATIENT
Start: 2025-05-22 | End: 2025-05-23

## 2025-05-22 RX ORDER — PENICILLIN G POTASSIUM 5000000 [IU]/1
INJECTION, POWDER, FOR SOLUTION INTRAMUSCULAR; INTRAVENOUS
Status: DISCONTINUED
Start: 2025-05-22 | End: 2025-05-23

## 2025-05-22 RX ORDER — EPHEDRINE SULFATE/0.9% NACL/PF 25 MG/5 ML
10 SYRINGE (ML) INTRAVENOUS
Status: DISCONTINUED | OUTPATIENT
Start: 2025-05-22 | End: 2025-05-23

## 2025-05-22 RX ADMIN — SODIUM CHLORIDE, SODIUM LACTATE, POTASSIUM CHLORIDE, AND CALCIUM CHLORIDE: .6; .31; .03; .02 INJECTION, SOLUTION INTRAVENOUS at 17:40

## 2025-05-22 RX ADMIN — Medication 5 ML: at 21:07

## 2025-05-22 RX ADMIN — DEXTROSE 5 MILLION UNITS: 50 INJECTION, SOLUTION INTRAVENOUS at 17:37

## 2025-05-22 RX ADMIN — OXYTOCIN 1 MILLI-UNITS/MIN: 10 INJECTION, SOLUTION INTRAMUSCULAR; INTRAVENOUS at 23:19

## 2025-05-22 RX ADMIN — Medication 15 ML/HR: at 21:08

## 2025-05-22 RX ADMIN — PENICILLIN G 3 MILLION UNITS: 3000000 INJECTION, SOLUTION INTRAVENOUS at 21:21

## 2025-05-22 ASSESSMENT — LIFESTYLE VARIABLES: SMOKING_STATUS: 0

## 2025-05-22 NOTE — PROGRESS NOTES
Dr. Kang updated on patient's arrival and pencillin started. Continue with current POC and Dr. Kang will AROM pt after second dose of penicillin.

## 2025-05-22 NOTE — ANESTHESIA PRE PROCEDURE
Department of Anesthesiology  Preprocedure Note       Name:  Yamileth Aguirre   Age:  24 y.o.  :  2000                                          MRN:  [id number]         Date:  2025      Surgeon: * No surgeons listed *    Procedure: * No procedures listed *    Medications prior to admission:   Prior to Admission medications    Medication Sig Start Date End Date Taking? Authorizing Provider   valACYclovir (VALTREX) 500 MG tablet Take 1 tablet by mouth 2 times daily 25  Yes Taniya Yeboah APRN - CNP   Prenatal Vit-Fe Fumarate-FA (PRENATAL VITAMINS) 28-0.8 MG TABS Take 1 tablet by mouth daily 24  Yes Taniya Yeboah APRN - CNP       Current medications:    Current Facility-Administered Medications   Medication Dose Route Frequency Provider Last Rate Last Admin    lactated ringers infusion   IntraVENous Continuous J Luis Kang  mL/hr at 25 1740 New Bag at 25 1740    lactated ringers bolus 500 mL  500 mL IntraVENous PRJ Luis Sifuentes MD        Or    lactated ringers bolus 500 mL  500 mL IntraVENous PRJ Luis Sifuentes MD        methylergonovine (METHERGINE) injection 200 mcg  200 mcg IntraMUSCular PRJ Luis Sifuentes MD        carboprost (HEMABATE) injection 250 mcg  250 mcg IntraMUSCular J Luis Coronel MD        miSOPROStol (CYTOTEC) tablet 400 mcg  400 mcg Buccal J Luis Coronel MD        tranexamic acid-NaCl IVPB premix 1,000 mg  1,000 mg IntraVENous Once PRN J Luis Kang MD        oxytocin (PITOCIN) 30 units in 500 mL infusion  87.3 gonzalez-units/min IntraVENous Continuous J Luis Coronel MD        And    oxytocin (PITOCIN) 10 unit bolus from the bag  10 Units IntraVENous J Luis Coronel MD        terbutaline (BRETHINE) injection 0.25 mg  0.25 mg SubCUTAneous Once PRN J Luis Kang MD        ondansetron (ZOFRAN-ODT) disintegrating tablet 4 mg  4 mg Oral Q8H PRJ Luis Sifuentes MD        Or

## 2025-05-22 NOTE — PROGRESS NOTES
Patient sent from office for augmentation of labor.   at 38w6d.  Patient denies LOF, VB dose feel some contractions.  +FM

## 2025-05-22 NOTE — H&P
Department of Obstetrics and Gynecology  Labor and Delivery  History & Physical    Patient:  Yamileth Aguirre     Admit Date:  2025  5:06 PM  Medical Record Number:  [id number]    CHIEF COMPLAINT:  IUP at 38 6/7 weeks, sent from office, 6 cm dilated, GBS colonized    PROBLEM LIST:     Patient Active Problem List   Diagnosis    GBS (group B Streptococcus carrier), +RV culture, currently pregnant -PCN G in labor    History of spontaneous  x1, currently pregnant    Maternal varicella, equivocal    History of herpes genitalis  (HSV-1 + genital culture G1) on Valtrex 500 mg BID since 36w    38 6/7 weeks gestation of pregnancy    Normal labor (6 cm in office)        HISTORY OF PRESENT ILLNESS:    The patient is a 24 y.o. W female  at 38w6d with a h/o GBS colonization, a spontaneous , varicella equivocal status and HSV-1 genitalis dx with her first pregnancy, on prophylactic Valtrex 500 mg BID who presented to the office for routine PNC and was found to be 6 cm dilated. She is having occasional contractions. She is GBS colonized and was sent to L&D for PCN G and active management of labor (amniotomy). She has no LOF and VB.  She has no sx UTI or PIH.  There is good FM.    ESTIMATED DUE DATE: Estimated Date of Delivery: 25    PRENATAL CARE:  Complicated by: see HPI and problem list  GBS: positive    Past OB History  OB History          3    Para   1    Term   1            AB   1    Living   1         SAB   1    IAB        Ectopic        Molar        Multiple   0    Live Births   1                Past Medical History:    History reviewed. No pertinent past medical history.    Past Surgical History:    History reviewed. No pertinent surgical history.    Allergies:  Poison ivy extract    Social History:    Social History     Socioeconomic History    Marital status: Single     Spouse name: Not on file    Number of children: Not on file    Years of education: Not on file    Highest

## 2025-05-23 PROBLEM — Z34.83 MULTIGRAVIDA IN THIRD TRIMESTER: Status: ACTIVE | Noted: 2025-05-22

## 2025-05-23 PROBLEM — Z3A.39 39 WEEKS GESTATION OF PREGNANCY: Status: ACTIVE | Noted: 2025-05-22

## 2025-05-23 PROCEDURE — 7200000001 HC VAGINAL DELIVERY

## 2025-05-23 PROCEDURE — 6370000000 HC RX 637 (ALT 250 FOR IP): Performed by: OBSTETRICS & GYNECOLOGY

## 2025-05-23 PROCEDURE — 0UQMXZZ REPAIR VULVA, EXTERNAL APPROACH: ICD-10-PCS | Performed by: OBSTETRICS & GYNECOLOGY

## 2025-05-23 PROCEDURE — 10907ZC DRAINAGE OF AMNIOTIC FLUID, THERAPEUTIC FROM PRODUCTS OF CONCEPTION, VIA NATURAL OR ARTIFICIAL OPENING: ICD-10-PCS | Performed by: OBSTETRICS & GYNECOLOGY

## 2025-05-23 PROCEDURE — 4A1HXCZ MONITORING OF PRODUCTS OF CONCEPTION, CARDIAC RATE, EXTERNAL APPROACH: ICD-10-PCS | Performed by: OBSTETRICS & GYNECOLOGY

## 2025-05-23 PROCEDURE — 1220000000 HC SEMI PRIVATE OB R&B

## 2025-05-23 RX ORDER — IBUPROFEN 600 MG/1
600 TABLET, FILM COATED ORAL EVERY 6 HOURS PRN
Status: DISCONTINUED | OUTPATIENT
Start: 2025-05-23 | End: 2025-05-24 | Stop reason: HOSPADM

## 2025-05-23 RX ORDER — TRANEXAMIC ACID 10 MG/ML
1000 INJECTION, SOLUTION INTRAVENOUS
Status: DISCONTINUED | OUTPATIENT
Start: 2025-05-23 | End: 2025-05-24 | Stop reason: HOSPADM

## 2025-05-23 RX ORDER — MISOPROSTOL 200 UG/1
400 TABLET ORAL PRN
Status: DISCONTINUED | OUTPATIENT
Start: 2025-05-23 | End: 2025-05-24 | Stop reason: HOSPADM

## 2025-05-23 RX ORDER — MISOPROSTOL 200 UG/1
800 TABLET ORAL PRN
Status: DISCONTINUED | OUTPATIENT
Start: 2025-05-23 | End: 2025-05-24 | Stop reason: HOSPADM

## 2025-05-23 RX ORDER — MODIFIED LANOLIN
OINTMENT (GRAM) TOPICAL PRN
Status: DISCONTINUED | OUTPATIENT
Start: 2025-05-23 | End: 2025-05-24 | Stop reason: HOSPADM

## 2025-05-23 RX ORDER — METHYLERGONOVINE MALEATE 0.2 MG/ML
200 INJECTION INTRAVENOUS PRN
Status: DISCONTINUED | OUTPATIENT
Start: 2025-05-23 | End: 2025-05-24 | Stop reason: HOSPADM

## 2025-05-23 RX ORDER — DOCUSATE SODIUM 100 MG/1
100 CAPSULE, LIQUID FILLED ORAL 2 TIMES DAILY
Status: DISCONTINUED | OUTPATIENT
Start: 2025-05-23 | End: 2025-05-24 | Stop reason: HOSPADM

## 2025-05-23 RX ORDER — ONDANSETRON 4 MG/1
4 TABLET, ORALLY DISINTEGRATING ORAL EVERY 6 HOURS PRN
Status: DISCONTINUED | OUTPATIENT
Start: 2025-05-23 | End: 2025-05-24 | Stop reason: HOSPADM

## 2025-05-23 RX ORDER — ONDANSETRON 2 MG/ML
4 INJECTION INTRAMUSCULAR; INTRAVENOUS EVERY 6 HOURS PRN
Status: DISCONTINUED | OUTPATIENT
Start: 2025-05-23 | End: 2025-05-24 | Stop reason: HOSPADM

## 2025-05-23 RX ORDER — PRENATAL WITH FERROUS FUM AND FOLIC ACID 3080; 920; 120; 400; 22; 1.84; 3; 20; 10; 1; 12; 200; 27; 25; 2 [IU]/1; [IU]/1; MG/1; [IU]/1; MG/1; MG/1; MG/1; MG/1; MG/1; MG/1; UG/1; MG/1; MG/1; MG/1; MG/1
1 TABLET ORAL
Status: DISCONTINUED | OUTPATIENT
Start: 2025-05-24 | End: 2025-05-24 | Stop reason: HOSPADM

## 2025-05-23 RX ORDER — ACETAMINOPHEN 500 MG
1000 TABLET ORAL EVERY 6 HOURS PRN
Status: DISCONTINUED | OUTPATIENT
Start: 2025-05-23 | End: 2025-05-24 | Stop reason: HOSPADM

## 2025-05-23 RX ADMIN — DOCUSATE SODIUM 100 MG: 100 CAPSULE, LIQUID FILLED ORAL at 21:25

## 2025-05-23 RX ADMIN — IBUPROFEN 600 MG: 600 TABLET ORAL at 21:24

## 2025-05-23 RX ADMIN — ACETAMINOPHEN 1000 MG: 500 TABLET ORAL at 15:54

## 2025-05-23 ASSESSMENT — PAIN DESCRIPTION - ORIENTATION: ORIENTATION: LOWER

## 2025-05-23 ASSESSMENT — PAIN DESCRIPTION - DESCRIPTORS: DESCRIPTORS: DISCOMFORT

## 2025-05-23 ASSESSMENT — PAIN DESCRIPTION - LOCATION: LOCATION: ABDOMEN

## 2025-05-23 ASSESSMENT — PAIN - FUNCTIONAL ASSESSMENT: PAIN_FUNCTIONAL_ASSESSMENT: ACTIVITIES ARE NOT PREVENTED

## 2025-05-23 ASSESSMENT — PAIN SCALES - GENERAL
PAINLEVEL_OUTOF10: 2
PAINLEVEL_OUTOF10: 4

## 2025-05-23 NOTE — PROGRESS NOTES
Pt refused colace, and is not wanting anything for pain at this time. Lactation called and made aware pt would like assistance.

## 2025-05-23 NOTE — ANESTHESIA PROCEDURE NOTES
Epidural Block    Patient location during procedure: OB  Reason for block: labor epidural  Staffing  Performed: resident/CRNA   Anesthesiologist: Fiordaliza Jimenez DO  Resident/CRNA: Gallito Taylor APRN - CRNA  Other anesthesia staff: Perry Antonio RN  Performed by: Gallito Taylor APRN - CRNA  Authorized by: Fiordaliza Jimenez DO    Epidural  Patient position: sitting  Prep: ChloraPrep  Patient monitoring: continuous pulse ox and frequent blood pressure checks  Approach: midline  Location: L3-4  Injection technique: ANDREW air  Provider prep: mask and sterile gloves  Needle  Needle type: Tuohy   Needle gauge: 18 G  Needle length: 3.5 in  Needle insertion depth: 6 cm  Catheter type: end hole  Catheter size: 20 G  Catheter at skin depth: 15 cm  Test dose: negativeCatheter Secured: tegaderm and tape  Assessment  Hemodynamics: stable  Attempts: 2  Outcomes: uncomplicated and patient tolerated procedure well  Additional Notes  Two attempts by SRNA  Preanesthetic Checklist  Completed: patient identified, IV checked, site marked, risks and benefits discussed, surgical/procedural consents, equipment checked, pre-op evaluation, timeout performed, anesthesia consent given, oxygen available and monitors applied/VS acknowledged

## 2025-05-23 NOTE — LACTATION NOTE
Introduced myself to patient. We discussed her experience feeding her first baby along with her goals for feeding this baby. She declined breastfeeding education and would like to read over the breastfeeding book on her own. I explained normal colostrum production and intake the first few days. I assisted with attempted latch on right side and then express feeding. Patient has a ton of colostrum. I encouraged her to call if she needs more assitance today.

## 2025-05-23 NOTE — PROGRESS NOTES
L&D PROGRESS NOTE:    Patient:  Yamileth Aguirre     Admit Date:  2025  5:06 PM  Medical Record Number:  [id number]   Today's Date: 2025    S: J Luis Bradford MD requesting AROM.    O:   Vitals:    254 25 21225 2200 25 2212   BP: 132/61 125/63 121/71 125/67   Pulse: 76 72  87   Resp: 16 16     Temp:       TempSrc:       SpO2: 100%  99%    Weight:       Height:         FHR:  Reassuring.  Cervix: 7 cm / 80% / soft / -1.  TOCO:  2 minutes-5 minutes.    A:24 y.o. W female at 38w6d   Active labor - 6 cm in office  GBS colonized  H/O HSV-1 genital culture (G1) - on Valtrex 500 mg BID since 36w - INACTIVE  Maternal Varicella equivocal3  Patient Active Problem List   Diagnosis    GBS (group B Streptococcus carrier), +RV culture, currently pregnant -PCN G in labor    History of spontaneous  x1, currently pregnant    Maternal varicella, equivocal    History of herpes genitalis  (HSV-1 + genital culture G1) on Valtrex 500 mg BID since 36w    38 6/7 weeks gestation of pregnancy    Normal labor (6 cm in office)     Fetal status: Reassuring  GBS: positive    P: AROM at 2247 without difficulty using the Amnihook. Ruptured clear fluid.  IV bolus/O2/position changes prn  See orders per J Luis Fatima MD.    J Luis Kagn MD, M.D. FACOG  2025 10:50 PM

## 2025-05-23 NOTE — L&D DELIVERY NOTE
25m  2nd stage: 0h 32m  3rd stage: 0h 03m    Delivery Details      Delivery Date: 25 Delivery Time: 02:57:00   Delivery Type: Vaginal, Spontaneous     Presentation    Presentation: Vertex  Position: Left  _: Occiput  _: Anterior    Shoulder Dystocia    Shoulder Dystocia Present?: No    Assisted Delivery Details    Forceps Attempted?: No  Vacuum Extractor Attempted?: No    Cord    Vessels: 3 Vessels  Complications: None  Delayed Cord Clamping?: Yes  Cord Clamped Date/Time: 2025 02:59:00  Cord Blood Disposition: Lab  Gases Sent?: Yes    Placenta    Date/Time: 2025 03:00:00  Removal: Spontaneous  Appearance: Intact  Disposition: Discarded    Lacerations    Episiotomy: None  Perineal Lacerations: None  Other Lacerations: periurethral laceration  Periurethral Laceration: Bilateral Repaired?: Yes   Number of Repair Packets: 1    Vaginal Counts    Initial Count Personnel: ANTELMO GUERRIER  Initial Count Verified By: KADIE GUERRIER  Intial Sponge Count: Correct Intial Needles Count: Correct Intial Instruments Count: Correct   Final Sponges Count: Correct Final Needles  Count: Correct Final Instruments Count: Correct   Final Count Personnel: ESTRELLITA  Final Count Verified By: KALEN GUERRIER  Accurate Final Count?: Yes    Blood Loss  Mother: Yamileth Aguirre #[id number]     Start of Mother's Information      Delivery Blood Loss   Intrapartum & Postpartum: 25 1500 - 25 0512    Delivery Admission: 25 1500 - 25 0512         Intrapartum & Postpartum Delivery Admission    Quantitative Blood Loss (mL) Hospital Encounter 150 grams 150 grams    Total  150 mL 150 mL     End of Mother's Information  Mother: Yamileth Aguirre #[id number]      Delivery Providers    Delivering clinician: J Luis Kang MD     Provider Role    J Luis Kang MD Obstetrician    Fabienne Mayes RN Primary Nurse    Barbie Ramírez RN Primary [facility] Nurse         [facility] Assessment    Living Status: Living

## 2025-05-23 NOTE — PROGRESS NOTES
Patient actively pushing. RN and Dr. Kang remain in continuously attendance and evaluation of FHR, ongoing via continuous EFM.

## 2025-05-24 VITALS
WEIGHT: 171 LBS | HEIGHT: 67 IN | BODY MASS INDEX: 26.84 KG/M2 | RESPIRATION RATE: 16 BRPM | DIASTOLIC BLOOD PRESSURE: 80 MMHG | OXYGEN SATURATION: 95 % | HEART RATE: 67 BPM | SYSTOLIC BLOOD PRESSURE: 131 MMHG | TEMPERATURE: 98.2 F

## 2025-05-24 PROBLEM — Z3A.39 39 WEEKS GESTATION OF PREGNANCY: Status: RESOLVED | Noted: 2025-05-22 | Resolved: 2025-05-24

## 2025-05-24 PROBLEM — Z34.83 MULTIGRAVIDA IN THIRD TRIMESTER: Status: RESOLVED | Noted: 2025-05-22 | Resolved: 2025-05-24

## 2025-05-24 PROBLEM — Z37.9 NORMAL LABOR: Status: RESOLVED | Noted: 2025-05-22 | Resolved: 2025-05-24

## 2025-05-24 PROBLEM — O09.299 HISTORY OF SPONTANEOUS ABORTION, CURRENTLY PREGNANT: Status: RESOLVED | Noted: 2023-03-31 | Resolved: 2025-05-24

## 2025-05-24 PROBLEM — O99.820 GBS (GROUP B STREPTOCOCCUS CARRIER), +RV CULTURE, CURRENTLY PREGNANT: Status: RESOLVED | Noted: 2023-03-31 | Resolved: 2025-05-24

## 2025-05-24 PROCEDURE — 6370000000 HC RX 637 (ALT 250 FOR IP): Performed by: OBSTETRICS & GYNECOLOGY

## 2025-05-24 RX ORDER — ACETAMINOPHEN 500 MG
1000 TABLET ORAL EVERY 6 HOURS PRN
COMMUNITY
Start: 2025-05-24

## 2025-05-24 RX ORDER — PSEUDOEPHEDRINE HCL 30 MG
100 TABLET ORAL 2 TIMES DAILY PRN
COMMUNITY
Start: 2025-05-24

## 2025-05-24 RX ORDER — IBUPROFEN 600 MG/1
600 TABLET, FILM COATED ORAL EVERY 6 HOURS PRN
Qty: 60 TABLET | Refills: 1 | Status: SHIPPED | OUTPATIENT
Start: 2025-05-24

## 2025-05-24 RX ORDER — MODIFIED LANOLIN
1 OINTMENT (GRAM) TOPICAL PRN
COMMUNITY
Start: 2025-05-24

## 2025-05-24 RX ADMIN — IBUPROFEN 600 MG: 600 TABLET ORAL at 14:24

## 2025-05-24 RX ADMIN — ACETAMINOPHEN 1000 MG: 500 TABLET ORAL at 09:03

## 2025-05-24 RX ADMIN — Medication: at 09:03

## 2025-05-24 RX ADMIN — PRENATAL WITH FERROUS FUM AND FOLIC ACID 1 TABLET: 3080; 920; 120; 400; 22; 1.84; 3; 20; 10; 1; 12; 200; 27; 25; 2 TABLET ORAL at 09:03

## 2025-05-24 RX ADMIN — WITCH HAZEL: 500 SOLUTION RECTAL; TOPICAL at 09:03

## 2025-05-24 RX ADMIN — IBUPROFEN 600 MG: 600 TABLET ORAL at 05:00

## 2025-05-24 RX ADMIN — DOCUSATE SODIUM 100 MG: 100 CAPSULE, LIQUID FILLED ORAL at 09:03

## 2025-05-24 ASSESSMENT — PAIN DESCRIPTION - LOCATION
LOCATION: ABDOMEN;PERINEUM
LOCATION: ABDOMEN
LOCATION: ABDOMEN

## 2025-05-24 ASSESSMENT — PAIN SCALES - GENERAL
PAINLEVEL_OUTOF10: 2
PAINLEVEL_OUTOF10: 4
PAINLEVEL_OUTOF10: 2

## 2025-05-24 ASSESSMENT — PAIN DESCRIPTION - DESCRIPTORS
DESCRIPTORS: CRAMPING
DESCRIPTORS: CRAMPING;DISCOMFORT
DESCRIPTORS: DISCOMFORT

## 2025-05-24 ASSESSMENT — PAIN - FUNCTIONAL ASSESSMENT: PAIN_FUNCTIONAL_ASSESSMENT: ACTIVITIES ARE NOT PREVENTED

## 2025-05-24 ASSESSMENT — PAIN DESCRIPTION - ORIENTATION: ORIENTATION: LOWER

## 2025-05-24 NOTE — DISCHARGE INSTRUCTIONS
weakness or dizziness.   If you are having flu-like symptoms such as achy muscles or joints.  There is a foul smell or a green color to your vaginal bleeding.  If you have pain that cannot be relieved.  You have persistent burning with urination or frequency.   Call if you have concerns about your well-being.  You are unable to sleep, eat, or are having thoughts of harming yourself or your baby.   You have swelling, bleeding, drainage, foul odor, redness, or warmth in/around your incision or stitches.  You have a red, warm, tender area in you calf.

## 2025-05-24 NOTE — PLAN OF CARE
Problem: Postpartum  Goal: Experiences normal postpartum course  Description:  Postpartum OB-Pregnancy care plan goal which identifies if the mother is experiencing a normal postpartum course  Outcome: Progressing     Problem: Postpartum  Goal: Appropriate maternal -  bonding  Description:  Postpartum OB-Pregnancy care plan goal which identifies if the mother and  are bonding appropriately  Outcome: Progressing     Problem: Postpartum  Goal: Establishment of infant feeding pattern  Description:  Postpartum OB-Pregnancy care plan goal which identifies if the mother is establishing a feeding pattern with their   Outcome: Progressing     Problem: Postpartum  Goal: Incisions, wounds, or drain sites healing without S/S of infection  Outcome: Progressing     Problem: Pain  Goal: Verbalizes/displays adequate comfort level or baseline comfort level  Outcome: Progressing  Flowsheets  Taken 2025 0031 by Meghana Lubin RN  Verbalizes/displays adequate comfort level or baseline comfort level:   Encourage patient to monitor pain and request assistance   Assess pain using appropriate pain scale   Administer analgesics based on type and severity of pain and evaluate response   Implement non-pharmacological measures as appropriate and evaluate response   Consider cultural and social influences on pain and pain management   Notify Licensed Independent Practitioner if interventions unsuccessful or patient reports new pain  Taken 2025 1850 by Nichelle Rodriguez, RN  Verbalizes/displays adequate comfort level or baseline comfort level:   Encourage patient to monitor pain and request assistance   Assess pain using appropriate pain scale   Administer analgesics based on type and severity of pain and evaluate response   Implement non-pharmacological measures as appropriate and evaluate response     Problem: Infection - Adult  Goal: Absence of infection at discharge  Outcome: Progressing     Problem: Infection

## 2025-05-24 NOTE — PROGRESS NOTES
PPD #1     Patient:  Yamileth Aguirre     Admit Date:  5/22/2025  5:06 PM  Medical Record Number:  [id number]   Today's Date: 5/24/2025    S: No complaints - doing well, would like a Day #1 discharge; tolerating diet: yes - general ; ambulating well: yes - up in room and halls; voiding without difficulty:  yes - has no urinary complaints; bm: yes - normal; flatus: yes - normal; pain meds appropriate: yes - ibuprofen 600 mg and Tylenol; vaginal bleeding: less than a period.    O:   Vitals:    05/23/25 1905 05/23/25 2254 05/24/25 0657 05/24/25 1456   BP: (!) 132/96 119/80 117/69 131/80   Pulse: 66 77 73 67   Resp: 16 16 16 16   Temp: 98.6 °F (37 °C) 98.1 °F (36.7 °C) 97.5 °F (36.4 °C) 98.2 °F (36.8 °C)   TempSrc: Oral  Oral Oral   SpO2: 96% 98% 96% 95%   Weight:       Height:         Gen: A&O, cooperative, pleasant   Heart: RRR   Lungs: CTA b/l   Abd; soft, NT, non distended, +BS  Back: no CVA or paraspinal tenderness   Ext: No clubbing, cyanosis, edema:no , no cords palpable, no calf tenderness   Neuro: intact   Uterus: firm, well contracted, nt   Perineum: intact, healing well   Darling pad: staining only, thin lochia    Lab Results   Component Value Date    HGB 13.5 05/22/2025    HCT 37.4 05/22/2025      Recent Results (from the past 72 hours)    OB FOLLOW UP TRANSABDOMINAL APPROACH    Collection Time: 05/22/25 12:00 AM   Result Value Ref Range    Biparietal Diameter      Abdominal Circumference      Femoral Diameter      Head Circumference      HC/AC      Estimated Fetal Weight     CBC with Auto Differential    Collection Time: 05/22/25  5:30 PM   Result Value Ref Range    WBC 10.3 4.5 - 11.5 k/uL    RBC 4.08 3.50 - 5.50 m/uL    Hemoglobin 13.5 11.5 - 15.5 g/dL    Hematocrit 37.4 34.0 - 48.0 %    MCV 91.7 80.0 - 99.9 fL    MCH 33.1 26.0 - 35.0 pg    MCHC 36.1 (H) 32.0 - 34.5 g/dL    RDW 13.3 11.5 - 15.0 %    Platelets 166 130 - 450 k/uL    MPV 12.3 (H) 7.0 - 12.0 fL    Neutrophils % 79 43.0 - 80.0 %    Lymphocytes % 13

## 2025-05-24 NOTE — ANESTHESIA POSTPROCEDURE EVALUATION
Department of Anesthesiology  Postprocedure Note    Patient: Yamileth Aguirre  MRN: [id number]  YOB: 2000  Date of evaluation: 5/23/2025    Procedure Summary       Date: 05/22/25 Room / Location:     Anesthesia Start: 2045 Anesthesia Stop: 05/23/25 0257    Procedure: Labor Analgesia Diagnosis:     Scheduled Providers:  Responsible Provider: Fiordaliza Jimenez DO    Anesthesia Type: general, spinal, epidural ASA Status: 2            Anesthesia Type: No value filed.    Ofelia Phase I: Ofelia Score: 10    Ofelia Phase II:      Anesthesia Post Evaluation    Patient location during evaluation: bedside  Patient participation: complete - patient participated  Level of consciousness: awake and alert  Pain score: 0  Airway patency: patent  Nausea & Vomiting: no nausea and no vomiting  Cardiovascular status: hemodynamically stable  Respiratory status: acceptable  Hydration status: euvolemic  Comments: Patient satisfied with epidural for labor  Pain management: adequate        No notable events documented.

## 2025-05-24 NOTE — PLAN OF CARE
Problem: Safety - Adult  Goal: Free from fall injury  5/24/2025 0925 by Nichelle Rodriguez RN  Outcome: Progressing  5/24/2025 0036 by Meghana Lubin RN  Outcome: Progressing     Problem: Discharge Planning  Goal: Discharge to home or other facility with appropriate resources  5/24/2025 0925 by Nichelle Rodriguez RN  Outcome: Progressing  5/24/2025 0036 by Meghana Lubin RN  Outcome: Progressing     Problem: Pain  Goal: Verbalizes/displays adequate comfort level or baseline comfort level  Recent Flowsheet Documentation  Taken 5/24/2025 0749 by Nichelle Rodriguez RN  Verbalizes/displays adequate comfort level or baseline comfort level:   Encourage patient to monitor pain and request assistance   Assess pain using appropriate pain scale   Administer analgesics based on type and severity of pain and evaluate response   Implement non-pharmacological measures as appropriate and evaluate response   Consider cultural and social influences on pain and pain management  5/24/2025 0036 by Meghana Lubin RN  Outcome: Progressing  Flowsheets  Taken 5/24/2025 0031 by Meghana Lubin RN  Verbalizes/displays adequate comfort level or baseline comfort level:   Encourage patient to monitor pain and request assistance   Assess pain using appropriate pain scale   Administer analgesics based on type and severity of pain and evaluate response   Implement non-pharmacological measures as appropriate and evaluate response   Consider cultural and social influences on pain and pain management   Notify Licensed Independent Practitioner if interventions unsuccessful or patient reports new pain  Taken 5/23/2025 1850 by Nichelle Rodriguez RN  Verbalizes/displays adequate comfort level or baseline comfort level:   Encourage patient to monitor pain and request assistance   Assess pain using appropriate pain scale   Administer analgesics based on type and severity of pain and evaluate response   Implement non-pharmacological measures as appropriate and

## 2025-05-25 NOTE — DISCHARGE INSTR - DIET

## 2025-05-25 NOTE — DISCHARGE SUMMARY
office)    Multigravida in third trimester         Post-delivery Diagnosis:  Living  infant Male, viable male infant delivered, periurethral lacerations  Problem List       Patient Active Problem List   Diagnosis    GBS (group B Streptococcus carrier), +RV culture, currently pregnant -PCN G in labor    History of spontaneous  x1, currently pregnant    Maternal varicella, equivocal    History of herpes genitalis  (HSV-1 + genital culture G1) on Valtrex 500 mg BID since 36w    39 weeks gestation of pregnancy    Normal labor (6 cm in office)    Multigravida in third trimester     (normal spontaneous vaginal delivery)    Term birth of  male    Periurethral laceration, delivered, current hospitalization         Information for the patient's :  Marc Aguirre [[id number]]   Normal Spontaneous Vaginal delivery, uncomplicated  Delivering Clinician: Jorge Luis Mack Wt:   Information for the patient's :  Marc Aguirre [[id number]]   8 pounds 9 ounces  3890 g     APGARS:            Information for the patient's :  Marc Aguirre [[id number]]   1 minute: 9  5 minute: 9     Anesthesia:  epidural anesthesia     Application and Delivery:  24 y.o. W female  at 39w0d with a history of  GBS colonization, a history of spontaneous , maternal varicella equivocal status and a history of HSV 1 (inactive on prophylactic Valtrex 500 mg twice daily) who was admitted from the office, 6 cm dilated for penicillin G prophylaxis and active management of labor who progressed to complete with augmentation by amniotomy and Pitocin and delivered Cephalic, left occiput anterior presentation via  @ 0257, under epidural anesthesia anesthesia,  over an intact perineum with resulting bilateral periurethral lacerations laceration, without dystocia or complication, a Live Born Male infant, weighing 8# 9oz, 3890 grams, Clear fluid at delivery, bulb suctioned on perineum. A nuchal

## 2025-05-25 NOTE — DISCHARGE INSTR - ACTIVITY
them  To avoid/relieve constipation take stool softeners if advised   Increase fiber in your diet    Most importantly ENJOY your Baby!  - Dr. LOPEZ =)))    Please call immediately with Questions and Concerns - [phone] (Office) or [phone] (Answering Service) after hours and weekends.     By signing below, I understand that if any emergent problems occur, once I leave the hospital, I am to dial #[phone] or go immediately to the nearest Emergency Room.  For less emergent matters,  Dr. Kang can be reached by calling his Office or  answering service at the above numbers.  I understand and acknowledge receipt of the instructions indicated above.